# Patient Record
Sex: MALE | Race: WHITE | NOT HISPANIC OR LATINO | Employment: OTHER | URBAN - NONMETROPOLITAN AREA
[De-identification: names, ages, dates, MRNs, and addresses within clinical notes are randomized per-mention and may not be internally consistent; named-entity substitution may affect disease eponyms.]

---

## 2022-09-19 ENCOUNTER — HOSPITAL ENCOUNTER (OUTPATIENT)
Dept: RADIOLOGY | Facility: HOSPITAL | Age: 47
Discharge: HOME | End: 2022-09-19
Attending: NURSE PRACTITIONER
Payer: COMMERCIAL

## 2022-09-19 DIAGNOSIS — M25.521 ELBOW PAIN, RIGHT: ICD-10-CM

## 2022-09-19 PROCEDURE — 73080 X-RAY EXAM OF ELBOW: CPT | Mod: TC,RT

## 2022-10-04 ENCOUNTER — HOSPITAL ENCOUNTER (OUTPATIENT)
Dept: RADIOLOGY | Facility: HOSPITAL | Age: 47
Discharge: HOME | End: 2022-10-04
Attending: NURSE PRACTITIONER
Payer: COMMERCIAL

## 2022-10-04 DIAGNOSIS — R74.8 ELEVATED LIVER ENZYMES: ICD-10-CM

## 2022-10-04 PROCEDURE — 76705 ECHO EXAM OF ABDOMEN: CPT | Mod: TC

## 2023-03-03 DIAGNOSIS — M25.512 LEFT SHOULDER PAIN, UNSPECIFIED CHRONICITY: Primary | ICD-10-CM

## 2023-09-08 ENCOUNTER — OFFICE VISIT (OUTPATIENT)
Dept: FAMILY MEDICINE | Facility: CLINIC | Age: 48
End: 2023-09-08
Payer: COMMERCIAL

## 2023-09-08 VITALS
HEART RATE: 82 BPM | TEMPERATURE: 99 F | DIASTOLIC BLOOD PRESSURE: 76 MMHG | WEIGHT: 227.81 LBS | OXYGEN SATURATION: 97 % | RESPIRATION RATE: 19 BRPM | SYSTOLIC BLOOD PRESSURE: 112 MMHG

## 2023-09-08 DIAGNOSIS — J06.9 VIRAL UPPER RESPIRATORY TRACT INFECTION: ICD-10-CM

## 2023-09-08 DIAGNOSIS — J02.9 SORE THROAT: ICD-10-CM

## 2023-09-08 DIAGNOSIS — R09.81 NASAL CONGESTION: Primary | ICD-10-CM

## 2023-09-08 PROCEDURE — 99213 PR OFFICE/OUTPT VISIT, EST, LEVL III, 20-29 MIN: ICD-10-PCS | Mod: ,,, | Performed by: FAMILY MEDICINE

## 2023-09-08 PROCEDURE — 3074F PR MOST RECENT SYSTOLIC BLOOD PRESSURE < 130 MM HG: ICD-10-PCS | Mod: CPTII,,, | Performed by: FAMILY MEDICINE

## 2023-09-08 PROCEDURE — 3074F SYST BP LT 130 MM HG: CPT | Mod: CPTII,,, | Performed by: FAMILY MEDICINE

## 2023-09-08 PROCEDURE — 3078F PR MOST RECENT DIASTOLIC BLOOD PRESSURE < 80 MM HG: ICD-10-PCS | Mod: CPTII,,, | Performed by: FAMILY MEDICINE

## 2023-09-08 PROCEDURE — 3078F DIAST BP <80 MM HG: CPT | Mod: CPTII,,, | Performed by: FAMILY MEDICINE

## 2023-09-08 PROCEDURE — 99213 OFFICE O/P EST LOW 20 MIN: CPT | Mod: ,,, | Performed by: FAMILY MEDICINE

## 2023-09-08 RX ORDER — FAMOTIDINE 10 MG/1
1 TABLET ORAL 2 TIMES DAILY
COMMUNITY

## 2023-09-08 RX ORDER — MULTIVITAMIN
1 TABLET ORAL DAILY
COMMUNITY

## 2023-09-08 RX ORDER — MONTELUKAST SODIUM 10 MG/1
TABLET ORAL
COMMUNITY

## 2023-09-08 RX ORDER — MELOXICAM 15 MG/1
15 TABLET ORAL DAILY PRN
COMMUNITY
Start: 2023-08-09

## 2023-09-08 RX ORDER — AZITHROMYCIN 250 MG/1
TABLET, FILM COATED ORAL
Qty: 6 TABLET | Refills: 0 | Status: SHIPPED | OUTPATIENT
Start: 2023-09-08 | End: 2023-09-13

## 2023-09-08 RX ORDER — AZITHROMYCIN 250 MG/1
TABLET, FILM COATED ORAL
Qty: 6 TABLET | Refills: 0 | Status: SHIPPED | OUTPATIENT
Start: 2023-09-08 | End: 2023-09-08

## 2023-09-08 RX ORDER — PANTOPRAZOLE SODIUM 20 MG/1
20 TABLET, DELAYED RELEASE ORAL
COMMUNITY
Start: 2023-08-09

## 2023-09-10 PROBLEM — J06.9 VIRAL UPPER RESPIRATORY TRACT INFECTION: Status: ACTIVE | Noted: 2023-09-10

## 2023-09-11 NOTE — PROGRESS NOTES
Amos Reed DO   [address]  [place]  [address]      PATIENT NAME: Vishnu Francis  : 1975  DATE: 23  MRN: [id number]      Billing Provider: Amos Reed DO  Level of Service:   Patient PCP Information       Provider PCP Type    Savana Stout FNP-C General            Reason for Visit / Chief Complaint: Sore Throat (Started today ), Nasal Congestion (Started ), and Headache (Today )       Update PCP  Update Chief Complaint         History of Present Illness / Problem Focused Workflow     Vishnu Francis presents to the clinic with Sore Throat (Started today ), Nasal Congestion (Started ), and Headache (Today )     Patient has a persistent sore throat with sinus pressure and pain states that it is mild-to-moderate.  He does have a history of his wife having a positive COVID test at home and she was positive in the office today.  He denies any cough nausea vomiting or diarrhea.    Sore Throat   Associated symptoms include headaches. Pertinent negatives include no abdominal pain, congestion, coughing, diarrhea, ear discharge, ear pain, neck pain, shortness of breath or vomiting.   Headache   Associated symptoms include a sore throat. Pertinent negatives include no abdominal pain, back pain, coughing, dizziness, ear pain, eye pain, eye redness, fever, nausea, neck pain, numbness, seizures, sinus pressure, vomiting or weakness.       Review of Systems     Review of Systems   Constitutional:  Negative for activity change, appetite change, chills, fatigue and fever.   HENT:  Positive for sore throat. Negative for nasal congestion, ear discharge, ear pain, mouth dryness, mouth sores, postnasal drip, sinus pressure/congestion and voice change.    Eyes:  Negative for pain, discharge, redness, itching and visual disturbance.   Respiratory:  Negative for apnea, cough, chest tightness, shortness of breath and wheezing.    Cardiovascular:  Negative for chest  pain, palpitations and leg swelling.   Gastrointestinal:  Negative for abdominal distention, abdominal pain, anal bleeding, blood in stool, change in bowel habit, constipation, diarrhea, nausea, vomiting, reflux and change in bowel habit.   Endocrine: Negative for cold intolerance, heat intolerance, polydipsia, polyphagia and polyuria.   Genitourinary:  Negative for difficulty urinating, enuresis, erectile dysfunction, frequency, genital sores, hematuria and urgency.   Musculoskeletal:  Negative for arthralgias, back pain, gait problem, leg pain, myalgias and neck pain.   Integumentary:  Negative for rash, mole/lesion, breast mass and breast discharge.   Allergic/Immunologic: Negative for environmental allergies and food allergies.   Neurological:  Positive for headaches. Negative for dizziness, vertigo, tremors, seizures, syncope, facial asymmetry, speech difficulty, weakness, light-headedness, numbness, coordination difficulties, memory loss and coordination difficulties.   Hematological:  Negative for adenopathy. Does not bruise/bleed easily.   Psychiatric/Behavioral:  Negative for agitation, behavioral problems, confusion, decreased concentration, dysphoric mood, hallucinations, self-injury, sleep disturbance and suicidal ideas. The patient is not nervous/anxious and is not hyperactive.    Breast: Negative for mass      Medical / Social / Family History     Past Medical History:   Diagnosis Date    Cancer     GERD (gastroesophageal reflux disease)        Past Surgical History:   Procedure Laterality Date    HERNIA REPAIR      skin cancer removal      below right eye Basal cell caromia       Social History    reports that he has never smoked. He has never used smokeless tobacco. He reports current alcohol use of about 1.0 standard drink of alcohol per week. He reports that he does not currently use drugs.    Family History  's family history includes Lung cancer (age of onset: 78) in his father; No Known  Problems in his brother, maternal grandfather, maternal grandmother, mother, paternal grandfather, paternal grandmother, and sister.    Medications and Allergies     Medications  No outpatient medications have been marked as taking for the 9/8/23 encounter (Office Visit) with Amos Reed DO.       Allergies  Review of patient's allergies indicates:   Allergen Reactions    Codeine Other (See Comments)     Pt states general discomfort        Physical Examination     Vitals:    09/08/23 1334   BP: 112/76   Pulse: 82   Resp: 19   Temp: 98.9 °F (37.2 °C)     Physical Exam  Constitutional:       General: He is not in acute distress.     Appearance: Normal appearance. He is normal weight.   HENT:      Head: Normocephalic.      Right Ear: Tympanic membrane normal.      Left Ear: Tympanic membrane normal.      Nose: Congestion present.      Mouth/Throat:      Mouth: Mucous membranes are moist.      Pharynx: Oropharynx is clear. No oropharyngeal exudate or posterior oropharyngeal erythema.   Eyes:      General: No scleral icterus.        Right eye: No discharge.         Left eye: No discharge.      Conjunctiva/sclera: Conjunctivae normal.      Pupils: Pupils are equal, round, and reactive to light.   Neck:      Vascular: No carotid bruit.   Cardiovascular:      Rate and Rhythm: Normal rate and regular rhythm.      Pulses: Normal pulses.      Heart sounds: Normal heart sounds. No murmur heard.     No friction rub.   Pulmonary:      Effort: Pulmonary effort is normal.      Breath sounds: Normal breath sounds. No wheezing.   Abdominal:      General: Abdomen is flat. Bowel sounds are normal.      Tenderness: There is no abdominal tenderness.   Musculoskeletal:         General: Normal range of motion.      Cervical back: Normal range of motion and neck supple.      Right lower leg: No edema.      Left lower leg: No edema.   Lymphadenopathy:      Cervical: No cervical adenopathy.   Skin:     General: Skin is warm.       "Capillary Refill: Capillary refill takes less than 2 seconds.      Findings: No rash.   Neurological:      General: No focal deficit present.      Mental Status: He is alert and oriented to person, place, and time. Mental status is at baseline.   Psychiatric:         Mood and Affect: Mood normal.         Behavior: Behavior normal.               No results found for: "WBC", "HGB", "HCT", "MCV", "PLT"       No results found for: "NA", "K", "CL", "CO2", "GLU", "BUN", "CREATININE", "CALCIUM", "PROT", "ALBUMIN", "BILITOT", "ALKPHOS", "AST", "ALT", "ANIONGAP", "ESTGFRAFRICA", "EGFRNONAA"   US Abdomen Limited_Liver  Narrative: EXAMINATION:  US ABDOMEN LIMITED_LIVER    CLINICAL HISTORY:  Abnormal levels of other serum enzymes    COMPARISON:  None.    TECHNIQUE:  Multiple longitudinal and transverse real-time sonographic images of the right upper quadrant of the abdomen  are obtained.    FINDINGS:  The liver measures 21.4 cm and demonstrates increased echogenicity without focal abnormality on submitted images. The gallbladder demonstrates no significant abnormal distention, wall thickening, abnormal intraluminal echoes, or pericholecystic fluid.  The common duct measures 0.2 cm in diameter and there is no evidence of intrahepatic ductal dilation.    Right kidney measures 10.8 cm. Pancreas partially obscured.  Impression: Increased echogenicity of the liver suspicious for steatosis.    Point of Service: [facility]    Electronically signed by: Benjy Mullen  Date:    10/04/2022  Time:    11:09     Procedures   No results found for: "CHOL"  No results found for: "HDL"  No results found for: "LDLCALC"  No results found for: "TRIG"  No results found for: "CHOLHDL"   No results found for: "LABA1C", "HGBA1C"   No results found for: "TSH", "P7UBGZS", "Z8WCFRJ", "THYROIDAB", "FREET4"   Assessment and Plan (including Health Maintenance)      Problem List  Smart Sets  Document Outside HM   :    Plan:         Health " Maintenance Due   Topic Date Due    Hepatitis C Screening  Never done    Lipid Panel  Never done    HIV Screening  Never done    TETANUS VACCINE  Never done    Colorectal Cancer Screening  Never done    Influenza Vaccine (1) 09/01/2023       Problem List Items Addressed This Visit          ENT    Viral upper respiratory tract infection    Current Assessment & Plan     Wife had a positive COVID test however the patient's COVID test was negative.  Did offer him Paxlovid and he did not want it at this time.  Offered him Zithromax Dosepak in case he had upper respiratory or atypical infection and  he accepted that.  Tylenol or Advil for pain or fever.  Follow-up on a p.r.n. basis.  If symptoms worsen we will rechecking for COVID.         Relevant Medications    azithromycin (Z-HERLINDA) 250 MG tablet     Other Visit Diagnoses       Nasal congestion    -  Primary    Relevant Orders    SARS Coronavirus 2 Antigen, POCT    POCT rapid strep A    POCT Influenza A/B Rapid Antigen    Sore throat        Relevant Orders    SARS Coronavirus 2 Antigen, POCT    POCT rapid strep A    POCT Influenza A/B Rapid Antigen            The patient has no Health Maintenance topics of status Not Due    No future appointments.     Follow up in about 4 weeks (around 10/6/2023), or if symptoms worsen or fail to improve.     Signature:  Amos Reed,   [place] [id number] [address] [place]  [address]    Date of encounter: 9/8/23

## 2023-09-11 NOTE — ASSESSMENT & PLAN NOTE
Wife had a positive COVID test however the patient's COVID test was negative.  Did offer him Paxlovid and he did not want it at this time.  Offered him Zithromax Dosepak in case he had upper respiratory or atypical infection and  he accepted that.  Tylenol or Advil for pain or fever.  Follow-up on a p.r.n. basis.  If symptoms worsen we will rechecking for COVID.